# Patient Record
(demographics unavailable — no encounter records)

---

## 2025-04-10 NOTE — HISTORY OF PRESENT ILLNESS
[Never] : never [TextBox_4] : 54-year-old female with history of hypertension, hyperlipidemia, thalamic stroke seen today for abnormalities on CAT scan.  Patient had been evaluated for anemia at [facility] [place] where CAT scan of the chest 1 year ago reportedly was found to have some calcific changes along the right pleura.  These films are not available for my review today.  Patient does complain of mild dyspnea which she attributes mostly to her obesity as well as stroke.  There is no history of cough, wheeze, sputum, fevers, chills, heartburn.  Patient was raised in the [place].  No history of tuberculosis or prior pneumonias

## 2025-04-10 NOTE — REASON FOR VISIT
[Initial] : an initial visit [Abnormal CXR/ Chest CT] : an abnormal CXR/ chest CT [Other: _____] : [unfilled] [Ad Hoc ] : provided by an ad hoc  [Interpreters_FullName] : Obed [Interpreters_Relationshiptopatient] : Son [TWNoteComboBox1] : [place]

## 2025-04-10 NOTE — DISCUSSION/SUMMARY
[FreeTextEntry1] : 54-year-old female seen today for calcific changes of the pleura.  No history of prior tuberculosis or exposures but the patient [place] and this may indicate old chronic scarring.  A follow-up CAT scan will be performed with further recommendations to follow including pulmonary function test

## 2025-04-10 NOTE — RESULTS/DATA
[TextEntry] : Exam requested by: ORLANDO HER DO [address] [place] [address] Patient: ANABELL SHEPHERD YOB: 1971 Phone: [phone] MRN: [id number] Acc: [id number] Date of Exam: 04- EXAM:  DIGITAL BILATERAL DIAGNOSTIC CALLBACK MAMMOGRAM AND TOMOSYNTHESIS AND BREAST  ULTRASOUND HISTORY:  The patient is seen for bilateral asymmetries. Age: 53 years old.  COMPARISON:  Study performed 03- MAMMOGRAM: TECHNIQUE: Bilateral focal spot compression tomographic imaging and true lateral tomographic imaging was  obtained. FINDINGS: BREAST COMPOSITION:  The breasts are almost entirely fatty.  The asymmetry in the superior posterior right breast persists then the appearance of a low-lying lymph node. The  asymmetry in the mid outer left breast masses. The posterior asymmetry is not identified. BREAST ULTRASOUND:   TECHNIQUE:  A bilateral targeted ultrasound.   FINDINGS:  There is a axillary tail lymph node on the right measuring 11 mm corresponding to the area of mammographic  asymmetry. In the left breast at 12:00 7 cm from the nipple is a 4 mm coarse calcification. At 2:00 8 cm from the nipple  is a 6 mm cyst which appears to correspond to the mid depth asymmetry. IMPRESSION:  No signs of malignancy right breast. Left breast asymmetries. Recommend diagnostic left breast mammographic followup in 6 months to ensure stability.  FOLLOW-UP: Follow-up imaging in 6 months. SITE PERFORMED: [facility] Confidential Printed: 04- 8:10 AM ANABELL SHEPHERD (Exam: 04- 2:20 PM) Page 1 of 2 Tel: [phone] - Fax: [phone] - [email or web address] Continued: Page 2 of 2 Patient: ANABELL SHEPHERD YOB: 1971 ASSESSMENT: BI-RADS Category 3:  Probably benign.  This examination should not preclude the clinical evaluation of a suspicious palpable abnormality. As per the FDA requirements, a layman's letter has been generated and sent to your patient stating the results and  recommendations of this breast imaging study. We have entered your patient into our reminder system and will notify  them when they are due for their next breast imaging exam.  Thank you for the opportunity to participate in the care of this patient.  ANDRESSA PERSAUD MD - Electronically Signed: 04- 3:26 PM Physician to Physician Direct Line is: [phone] [facility] Site Name: [facility] [address] [place] [address]  ORLANDO HER DO [address] [place] [address] Patient: ANABELL SHEPHERD YOB: 1971 Phone: [phone] MRN: [id number] Acc: [id number] Date of Exam: 03- EXAM: DIGITAL BILATERAL SCREENING MAMMOGRAM WITH CAD AND TOMOSYNTHESIS HISTORY: The patient is 53 years old and is seen for a screening mammogram.  Family history of breast cancer: None. CLINICAL BREAST EXAMINATION: The patient reports that her last clinical breast exam was within the past year.  TECHNIQUE: The following views were obtained digitally: bilateral craniocaudal, bilateral mediolateral oblique. Lowdose full-field digital breast tomosynthesis examination was performed with tomosynthesis acquisitions and synthesized  2D reconstructed mammogram. Computer-aided detection was applied. COMPARISON: No comparisons at this time. FINDINGS: BREAST COMPOSITION: The breasts are almost entirely fatty. On the MLO view in the retroareolar right breast, posterior third, 13.5 cm from the nipple, is an asymmetry. This projects  laterally. Radiopaque cutaneous marker annotates a region of postsurgical scarring in the left breast. In the upper outer quadrant of the left breast, middle third, 8.1 cm from the nipple, is a focal asymmetry. Also on the CC view in the outer left breast, posterior third, 10.9 cm from the nipple, is an asymmetry. No suspicious architectural distortion, or significant calcifications are detected . IMPRESSION: Bilateral breast asymmetries Additional diagnostic mammographic views and breast ultrasound are  recommended for further evaluation.  FOLLOW-UP: Additional imaging. ASSESSMENT: BI-RADS Category 0: Incomplete: Need additional imaging evaluation.  This examination should not preclude the clinical evaluation of a suspicious palpable abnormality.  As per the FDA requirements, a layman's letter has been generated and sent to your patient stating the results and  recommendations of this breast imaging study. We have entered your patient into our reminder system and will notify  them when they are due for their next breast imaging exam.  SITE PERFORMED: [facility] Confidential Printed: 04- 8:11 AM ANABELL SHEPHERD (Exam: 03- 6:00 PM) Page 1 of 2 Tel: [phone] - Fax: [phone] - [email or web address] Continued: Page 2 of 2 Patient: ANABELL SHEPHERD YOB: 1971 Thank you for the opportunity to participate in the care of this patient.  GUZMAN NO MD - Electronically Signed: 03- 8:41 AM Physician to Physician Direct Line is: [facility] Site Name: [facility] [address] [place] [address]